# Patient Record
Sex: FEMALE | Race: WHITE | NOT HISPANIC OR LATINO | Employment: UNEMPLOYED | URBAN - METROPOLITAN AREA
[De-identification: names, ages, dates, MRNs, and addresses within clinical notes are randomized per-mention and may not be internally consistent; named-entity substitution may affect disease eponyms.]

---

## 2017-04-04 RX ORDER — GABAPENTIN 300 MG/1
CAPSULE ORAL
Qty: 150 CAPSULE | Refills: 0 | Status: SHIPPED | OUTPATIENT
Start: 2017-04-04 | End: 2017-06-12 | Stop reason: SDUPTHER

## 2017-06-12 RX ORDER — GABAPENTIN 300 MG/1
CAPSULE ORAL
Qty: 150 CAPSULE | Refills: 0 | Status: SHIPPED | OUTPATIENT
Start: 2017-06-12 | End: 2017-09-12 | Stop reason: SDUPTHER

## 2017-06-15 ENCOUNTER — PATIENT OUTREACH (OUTPATIENT)
Dept: ADMINISTRATIVE | Facility: HOSPITAL | Age: 52
End: 2017-06-15

## 2017-07-10 DIAGNOSIS — Z12.11 COLON CANCER SCREENING: ICD-10-CM

## 2017-07-12 RX ORDER — GABAPENTIN 300 MG/1
CAPSULE ORAL
Qty: 150 CAPSULE | Refills: 0 | OUTPATIENT
Start: 2017-07-12 | End: 2017-08-11

## 2017-09-14 RX ORDER — GABAPENTIN 300 MG/1
CAPSULE ORAL
Qty: 150 CAPSULE | Refills: 3 | Status: SHIPPED | OUTPATIENT
Start: 2017-09-14 | End: 2018-04-19 | Stop reason: SDUPTHER

## 2018-02-13 ENCOUNTER — HOSPITAL ENCOUNTER (EMERGENCY)
Facility: HOSPITAL | Age: 53
Discharge: HOME | End: 2018-02-13
Attending: EMERGENCY MEDICINE
Payer: COMMERCIAL

## 2018-02-13 VITALS
DIASTOLIC BLOOD PRESSURE: 91 MMHG | OXYGEN SATURATION: 100 % | WEIGHT: 160 LBS | HEIGHT: 66 IN | RESPIRATION RATE: 18 BRPM | BODY MASS INDEX: 25.71 KG/M2 | SYSTOLIC BLOOD PRESSURE: 135 MMHG | TEMPERATURE: 98 F | HEART RATE: 77 BPM

## 2018-02-13 DIAGNOSIS — S51.812A LACERATION OF LEFT FOREARM, INITIAL ENCOUNTER: Primary | ICD-10-CM

## 2018-02-13 PROCEDURE — 12002 RPR S/N/AX/GEN/TRNK2.6-7.5CM: CPT

## 2018-02-13 PROCEDURE — 25000003 PHARM REV CODE 250: Performed by: EMERGENCY MEDICINE

## 2018-02-13 PROCEDURE — 99283 EMERGENCY DEPT VISIT LOW MDM: CPT | Mod: 25

## 2018-02-13 RX ORDER — LIDOCAINE HYDROCHLORIDE 10 MG/ML
10 INJECTION, SOLUTION EPIDURAL; INFILTRATION; INTRACAUDAL; PERINEURAL
Status: COMPLETED | OUTPATIENT
Start: 2018-02-13 | End: 2018-02-13

## 2018-02-13 RX ORDER — LIDOCAINE HYDROCHLORIDE 10 MG/ML
5 INJECTION, SOLUTION EPIDURAL; INFILTRATION; INTRACAUDAL; PERINEURAL
Status: DISCONTINUED | OUTPATIENT
Start: 2018-02-13 | End: 2018-02-13

## 2018-02-13 RX ADMIN — LIDOCAINE HYDROCHLORIDE 100 MG: 10 INJECTION, SOLUTION EPIDURAL; INFILTRATION; INTRACAUDAL; PERINEURAL at 06:02

## 2018-02-13 NOTE — ED PROVIDER NOTES
Encounter Date: 2018       History     Chief Complaint   Patient presents with    Laceration     laceration to L wrist after tripping on sidewalk and catching herself with her wrist on old fence and cut her wrist. Pressure dressing applied at triage.     Pt is a 52-year-old female with pmhx of back pain who presents today with a laceration noted to left forearm that occurred PTA. Pt was walking down the sidewalk, tripped, and caught herself with her left arm on a [place]iron fence. Denies hitting her head. Denies LOC, dizziness, N/V, neck pain. Denies numbness/tingling, fb sensation, and decreased ROM.  Bleeding controlled.  Pt is UTD on tetanus shot. Denies any other complaints at this time.       The history is provided by the patient.   Laceration    The incident occurred just prior to arrival. The laceration is located on the left arm. The laceration is 3 cm in size. The laceration mechanism was a a metal edge. The pain is at a severity of 4/10. The pain has been intermittent since onset. She reports no foreign bodies present. Her tetanus status is UTD.     Review of patient's allergies indicates:  No Known Allergies  Past Medical History:   Diagnosis Date    Allergy     seasonal    Arthritis     Asthma     Atypical nevi     Joint pain     Melanoma     treated surgically in [place]. No SLN bx, chemo, or radiation    Ulcer     stomach      Past Surgical History:   Procedure Laterality Date    CARPAL TUNNEL RELEASE       SECTION      FOOT FRACTURE SURGERY      KNEE ARTHROSCOPY      RHINOPHYMA RESECTION      right hand surgery      SHOULDER SURGERY       Family History   Problem Relation Age of Onset    Adopted: Yes    Melanoma Neg Hx     Psoriasis Neg Hx     Lupus Neg Hx     Eczema Neg Hx     Acne Neg Hx     Hypertension Neg Hx     Diabetes Neg Hx      Social History   Substance Use Topics    Smoking status: Never Smoker    Smokeless tobacco: Never Used    Alcohol use 6.0 oz/week      10 Standard drinks or equivalent per week      Comment: social     Review of Systems   Constitutional: Negative for activity change, chills and fever.   HENT: Negative for facial swelling.    Eyes: Negative for pain and visual disturbance.   Respiratory: Negative for chest tightness, shortness of breath and wheezing.    Cardiovascular: Negative for chest pain and palpitations.   Gastrointestinal: Negative for abdominal pain, nausea and vomiting.   Genitourinary: Negative for difficulty urinating.   Musculoskeletal: Negative for back pain, myalgias, neck pain and neck stiffness.   Skin: Negative for rash.        Forearm laceration   Allergic/Immunologic: Negative for immunocompromised state.   Neurological: Negative for dizziness, syncope, facial asymmetry, weakness, light-headedness and numbness.   Hematological: Does not bruise/bleed easily.   Psychiatric/Behavioral: Negative for confusion.   All other systems reviewed and are negative.      Physical Exam     Initial Vitals [02/13/18 1618]   BP Pulse Resp Temp SpO2   (!) 135/91 77 18 98 °F (36.7 °C) 100 %      MAP       105.67         Physical Exam    Nursing note and vitals reviewed.  Constitutional: Vital signs are normal. She appears well-developed and well-nourished. She is not diaphoretic. She is cooperative.  Non-toxic appearance. She does not have a sickly appearance. She does not appear ill. No distress.   HENT:   Head: Normocephalic.   Right Ear: Hearing, tympanic membrane, external ear and ear canal normal.   Left Ear: Hearing, external ear and ear canal normal.   Nose: Nose normal. No rhinorrhea or nose lacerations.   Mouth/Throat: Oropharynx is clear and moist.   Eyes: Pupils are equal, round, and reactive to light.   Neck: Normal range of motion and full passive range of motion without pain. Neck supple.   Cardiovascular: Normal rate, regular rhythm and normal heart sounds.   Pulses:       Radial pulses are 2+ on the right side, and 2+ on the left  side.   Pulmonary/Chest: Effort normal and breath sounds normal. No respiratory distress. She has no decreased breath sounds. She has no wheezes. She has no rhonchi. She has no rales.   Abdominal: Soft. Bowel sounds are normal. There is no tenderness.   Musculoskeletal: Normal range of motion. She exhibits no tenderness.        Left forearm: She exhibits laceration. She exhibits no tenderness, no bony tenderness, no swelling, no edema and no deformity.        Arms:  Neurological: She is alert and oriented to person, place, and time. She has normal strength. No sensory deficit. She exhibits normal muscle tone. Coordination and gait normal.   Skin: Skin is warm and dry. Capillary refill takes less than 2 seconds. Bruising and laceration noted. No rash noted. No erythema.        Approximately 3 cm laceration noted to left forearm, no FB noted.  No active bleeding   Psychiatric: She has a normal mood and affect. Her speech is normal and behavior is normal. Judgment normal. Cognition and memory are normal.         ED Course   Lac Repair  Date/Time: 2/13/2018 6:32 PM  Performed by: SUE HAIRSTON  Authorized by: LETI DRISCOLL   Body area: upper extremity  Location details: left lower arm  Laceration length: 3 cm  Foreign bodies: no foreign bodies  Tendon involvement: none  Nerve involvement: none  Vascular damage: no  Anesthesia: local infiltration    Anesthesia:  Local Anesthetic: lidocaine 1% without epinephrine  Anesthetic total: 10 mL  Patient sedated: no  Irrigation solution: saline  Irrigation method: syringe  Amount of cleaning: standard  Debridement: none  Degree of undermining: none  Skin closure: 4-0 Prolene  Number of sutures: 6  Technique: simple  Approximation: loose  Approximation difficulty: simple  Dressing: fine mesh gauze and non-stick sterile dressing  Patient tolerance: Patient tolerated the procedure well with no immediate complications        Labs Reviewed - No data to display        X-Rays:   Independently Interpreted Readings:   Other Readings:  Left FA:  No f.b.          Medical Decision Making:   Initial Assessment:   Pt is a 52-year-old female who presents to ED with accidental laceration to left forearm. Pt has 3 cm laceration noted to her left forearm. No foreign bodies noted. Bleeding controlled. Tetanus UTD. Sensation and strength intact in bilateral upper extremities. Radial 2+ bilaterally by palpation.   Differential Diagnosis:   Laceration, retained foreign body  Clinical Tests:   Radiological Study: Ordered and Reviewed  ED Management:  Xray, sutures   Xray revealed no foreign bodies. Xray read by radiologist and reviewed by physician and me. Pt instructed to get sutures removed in 7-10 days and to return to ED if she notices any redness, yellow drainage, or has any uncontrollable pain. Pt verbalized understanding, is in agreement and compliance with treatment plan. Laceration well-approximated.               Attending Attestation:     Physician Attestation Statement for NP/PA:   I have conducted a face to face encounter with this patient in addition to the NP/PA, due to Medical Complexity    Other NP/PA Attestation Additions:    History of Present Illness: agree   Physical Exam: Agree:  3 cm FA laceration, no FB.  Sensation, motor intact.     Medical Decision Making: Agree:  Wound sutured and well approximated by NP.  No active bleeding.  Pt remains NV intact.  She will f/u with pcp in 10 days for suture removal.                 ED Course      Clinical Impression:   The primary encounter diagnosis was Laceration of left forearm, initial encounter. A diagnosis of Laceration and contusion of cerebral cortex was also pertinent to this visit.    Disposition:   Disposition: Discharged  Condition: Stable                        Cristiano Woodard NP  02/13/18 [id number]       Gema Bellamy MD  02/13/18 1911

## 2018-02-13 NOTE — ED NOTES
Patient identifiers verified and correct for Maria R Tucker Ohri.  Pt sitting up on edge of bed, AAO x's 3,  at bedside. Pt stated that she came to the ER with c/o left wrist laceration second to falling and landing on a fence. Laceration to left wrist noted to be about 5 cm x 3 cm with minimal bleeding noted. Pressure dressing removed.   LOC: The patient is awake, alert and aware of environment with an appropriate affect, the patient is oriented x 3 and speaking appropriately.  APPEARANCE: Patient resting comfortably and in no acute distress, patient is clean and well groomed, patient's clothing is properly fastened.  SKIN: The skin is warm and dry, color consistent with ethnicity, patient has normal skin turgor and moist mucus membranes, skin intact, no breakdown or bruising noted.  MUSCULOSKELETAL: Patient moving all extremities spontaneously, no obvious swelling or deformities noted.  RESPIRATORY: Airway is open and patent, respirations are spontaneous, patient has a normal effort and rate.  CARDIAC: Patient has a normal rate and regular rhythm, no periphreal edema noted, capillary refill < 3 seconds.  ABDOMEN: Soft and non tender to palpation, no distention noted, normoactive bowel sounds present in all four quadrants.  NEUROLOGIC: PERRL, eyes open spontaneously, behavior appropriate to situation, follows commands, facial expression symmetrical, bilateral hand grasp equal and even, purposeful motor response noted, normal sensation in all extremities when touched with a finger.

## 2018-02-14 NOTE — DISCHARGE INSTRUCTIONS
Sutures need to come out in 7-10 days. Return to ED if you experience uncontrollable pain, fever, redness,or yellow drainage from area.

## 2018-02-23 ENCOUNTER — OFFICE VISIT (OUTPATIENT)
Dept: INTERNAL MEDICINE | Facility: CLINIC | Age: 53
End: 2018-02-23
Payer: COMMERCIAL

## 2018-02-23 VITALS
TEMPERATURE: 98 F | RESPIRATION RATE: 18 BRPM | DIASTOLIC BLOOD PRESSURE: 76 MMHG | WEIGHT: 164 LBS | HEART RATE: 67 BPM | HEIGHT: 66 IN | SYSTOLIC BLOOD PRESSURE: 108 MMHG | BODY MASS INDEX: 26.36 KG/M2

## 2018-02-23 DIAGNOSIS — R68.89 FLU-LIKE SYMPTOMS: ICD-10-CM

## 2018-02-23 DIAGNOSIS — Z48.02 VISIT FOR SUTURE REMOVAL: ICD-10-CM

## 2018-02-23 DIAGNOSIS — L03.114 CELLULITIS OF LEFT UPPER EXTREMITY: Primary | ICD-10-CM

## 2018-02-23 PROCEDURE — 99999 PR PBB SHADOW E&M-EST. PATIENT-LVL III: CPT | Mod: PBBFAC,,, | Performed by: INTERNAL MEDICINE

## 2018-02-23 PROCEDURE — 3008F BODY MASS INDEX DOCD: CPT | Mod: S$GLB,,, | Performed by: INTERNAL MEDICINE

## 2018-02-23 PROCEDURE — 99214 OFFICE O/P EST MOD 30 MIN: CPT | Mod: S$GLB,,, | Performed by: INTERNAL MEDICINE

## 2018-02-23 PROCEDURE — 99024 POSTOP FOLLOW-UP VISIT: CPT | Mod: S$GLB,,, | Performed by: INTERNAL MEDICINE

## 2018-02-23 RX ORDER — CEPHALEXIN 500 MG/1
500 CAPSULE ORAL EVERY 12 HOURS
Qty: 14 CAPSULE | Refills: 0 | Status: SHIPPED | OUTPATIENT
Start: 2018-02-23 | End: 2018-03-02

## 2018-02-23 RX ORDER — MUPIROCIN 20 MG/G
OINTMENT TOPICAL 3 TIMES DAILY
Qty: 30 G | Refills: 0 | Status: SHIPPED | OUTPATIENT
Start: 2018-02-23

## 2018-02-23 NOTE — PROGRESS NOTES
"Subjective:       Patient ID: Maria R Preciado is a 52 y.o. female.    Chief Complaint: Suture / Staple Removal; Fever; Cough; Sore Throat; and Generalized Body Aches    HPI   Patient presenting with fever and cough.  She notes sore throat for the last 7 days.  She was seen in ED about 10 days ago and thinks she may have picked something up there.  She feels like "breathing is off."  She notes chills and some muscle aches.  She has been using medications and has had mild relief.  No SOB or Cp.     She also is here for suture removal.  She had laceration of left wrist on 2/13 and was seen in the Ed.  She notes continued redness and swelling.  She also notes some tenderness.      Review of Systems   Constitutional: Positive for chills and fever. Negative for activity change.   HENT: Positive for congestion, postnasal drip, rhinorrhea and sore throat. Negative for sinus pain and sinus pressure.    Eyes: Negative for pain and redness.   Respiratory: Positive for cough. Negative for shortness of breath.    Cardiovascular: Negative for chest pain.   Gastrointestinal: Negative for abdominal pain, constipation, nausea and vomiting.   Genitourinary: Negative for difficulty urinating.   Musculoskeletal: Positive for myalgias. Negative for arthralgias and joint swelling.   Skin: Positive for wound. Negative for rash.   Neurological: Negative for dizziness and light-headedness.   Psychiatric/Behavioral: Negative for dysphoric mood and sleep disturbance.     Objective:     Vitals:    02/23/18 1120   BP: 108/76   Pulse: 67   Resp: 18   Temp: 98.2 °F (36.8 °C)    Body mass index is 26.47 kg/m².  Physical Exam   Constitutional: She is oriented to person, place, and time. She appears well-developed and well-nourished.   HENT:   Head: Normocephalic and atraumatic.   Mouth/Throat: Oropharynx is clear and moist.   Eyes: Conjunctivae are normal. Pupils are equal, round, and reactive to light.   Neck: Normal range of motion. No " tracheal deviation present. No thyromegaly present.   Cardiovascular: Normal rate, regular rhythm, normal heart sounds and intact distal pulses.  Exam reveals no gallop and no friction rub.    No murmur heard.  Pulmonary/Chest: Effort normal and breath sounds normal. She has no wheezes. She has no rales.   Musculoskeletal: She exhibits edema and tenderness.        Left wrist: She exhibits tenderness.   Left wrist with laceration present, +erythema and edema, mild fluctuance, TTP   Lymphadenopathy:        Head (right side): Tonsillar adenopathy present.        Head (left side): Tonsillar adenopathy present.     She has cervical adenopathy.   Neurological: She is alert and oriented to person, place, and time.   Skin: Skin is warm and dry. No rash noted.   Psychiatric: She has a normal mood and affect. Judgment normal.     Assessment:     1. Cellulitis of left upper extremity    2. Visit for suture removal    3. Flu-like symptoms      Plan:   1. Cellulitis of left upper extremity  - warm compresses, vaseline  - cephALEXin (KEFLEX) 500 MG capsule; Take 1 capsule (500 mg total) by mouth every 12 (twelve) hours.  Dispense: 14 capsule; Refill: 0  - mupirocin (BACTROBAN) 2 % ointment; Apply topically 3 (three) times daily.  Dispense: 30 g; Refill: 0  - ED precautions provided    2. Visit for suture removal  Subjective:      Maria R Preciado is a 52 y.o. female who obtained a laceration 10 days ago, which required closure with 6 sutures. Mechanism of injury: tripped and cut on chain fence. She denies pain, redness, or drainage from the wound. Her last tetanus was 4 years ago.    The following portions of the patient's history were reviewed and updated as appropriate: allergies, current medications, past family history, past medical history, past social history, past surgical history and problem list.    Review of Systems  Pertinent items are noted in HPI.      Objective:      /76 (BP Location: Left arm, Patient  "Position: Sitting)   Pulse 67   Temp 98.2 °F (36.8 °C) (Oral)   Resp 18   Ht 5' 6" (1.676 m)   Wt 74.4 kg (164 lb 0.4 oz)   LMP 01/07/2018   BMI 26.47 kg/m²   Injury exam:  A 3 cm laceration noted on the left dorsal wrist is healing well, with evidence of infection.      Assessment:      Laceration is healing well, with evidence of infection.      Plan:        1. 6 sutures were removed.  2. Wound care discussed.  3. Follow up as needed.     3. Flu-like symptoms  - fluids, tylenol/NSAIDs prn, OTC antihistamine  - outside the window for tamiflu        Patient is to call or return to clinic if symptoms worsen or fail to improve.    "

## 2018-02-23 NOTE — PATIENT INSTRUCTIONS
Cellulitis  Cellulitis is an infection of the deep layers of skin. A break in the skin, such as a cut or scratch, can let bacteria under the skin. If the bacteria get to deep layers of the skin, it can be serious. If not treated, cellulitis can get into the bloodstream and lymph nodes. The infection can then spread throughout the body. This causes serious illness.  Cellulitis causes the affected skin to become red, swollen, warm, and sore. The reddened areas have a visible border. An open sore may leak fluid (pus). You may have a fever, chills, and pain.  Cellulitis is treated with antibiotics taken for 7 to 10 days. An open sore may be cleaned and covered with cool wet gauze. Symptoms should get better 1 to 2 days after treatment is started. Make sure to take all the antibiotics for the full number of days until they are gone. Keep taking the medicine even if your symptoms go away.  Home care  Follow these tips:  · Limit the use of the part of your body with cellulitis.   · If the infection is on your leg, keep your leg raised while sitting. This will help to reduce swelling.  · Take all of the antibiotic medicine exactly as directed until it is gone. Do not miss any doses, especially during the first 7 days. Dont stop taking the medicine when your symptoms get better.  · Keep the affected area clean and dry.  · Wash your hands with soap and warm water before and after touching your skin. Anyone else who touches your skin should also wash his or her hands. Don't share towels.  Follow-up care  Follow up with your healthcare provider, or as advised. If your infection does not go away on the first antibiotic, your healthcare provider will prescribe a different one.  When to seek medical advice  Call your healthcare provider right away if any of these occur:  · Red areas that spread  · Swelling or pain that gets worse  · Fluid leaking from the skin (pus)  · Fever higher of 100.4º F (38.0º C) or higher after 2 days  on antibiotics  Date Last Reviewed: 9/1/2016  © 9914-4510 [employer]. [address] [place] [address], [place] [address]. All rights reserved. This information is not intended as a substitute for professional medical care. Always follow your healthcare professional's instructions.

## 2018-03-05 ENCOUNTER — OFFICE VISIT (OUTPATIENT)
Dept: URGENT CARE | Facility: CLINIC | Age: 53
End: 2018-03-05
Payer: COMMERCIAL

## 2018-03-05 VITALS
RESPIRATION RATE: 18 BRPM | TEMPERATURE: 98 F | BODY MASS INDEX: 26.36 KG/M2 | SYSTOLIC BLOOD PRESSURE: 139 MMHG | HEART RATE: 70 BPM | DIASTOLIC BLOOD PRESSURE: 79 MMHG | OXYGEN SATURATION: 100 % | HEIGHT: 66 IN | WEIGHT: 164 LBS

## 2018-03-05 DIAGNOSIS — S41.112D LACERATION OF LEFT UPPER EXTREMITY, SUBSEQUENT ENCOUNTER: Primary | ICD-10-CM

## 2018-03-05 DIAGNOSIS — S59.902A ELBOW INJURY, LEFT, INITIAL ENCOUNTER: ICD-10-CM

## 2018-03-05 DIAGNOSIS — M77.02 EPICONDYLITIS ELBOW, MEDIAL, LEFT: ICD-10-CM

## 2018-03-05 PROCEDURE — 99214 OFFICE O/P EST MOD 30 MIN: CPT | Mod: S$GLB,,, | Performed by: NURSE PRACTITIONER

## 2018-03-05 RX ORDER — SULFAMETHOXAZOLE AND TRIMETHOPRIM 800; 160 MG/1; MG/1
1 TABLET ORAL 2 TIMES DAILY
Qty: 10 TABLET | Refills: 0 | Status: SHIPPED | OUTPATIENT
Start: 2018-03-05 | End: 2018-03-10

## 2018-03-05 NOTE — PATIENT INSTRUCTIONS
Extremity Laceration: Suture or Tape (Child)  A laceration is a cut through the skin. If it is large or deep, it may require stitches (sutures) or staples to close the wound so it can heal. Minor cuts may be closed with surgical tape.   X-rays may be done if something may have entered the skin through the cut. Your child may also need a tetanus shot if he or she is not up to date on this vaccination.  Home care  Your childs health care provider may prescribe an antibiotic to help prevent infection. Follow all instructions for giving this medicine to your child. Make sure your child takes the medicine every day until it is gone or told to stop.  If your child has pain, you can give him or her pain medicine as advised by the healthcare provider. Do not give your child aspirin.  In rare cases it can cause serious problems in children 15 years of age and younger.  Dont give your child any other medicine without asking the healthcare provider first.    General care  · Follow the health care providers instructions on how to care for the cut.  · Wash your hands with soap and warm water before and after caring for your child's cut. This is to help prevent infection.  · Leave the original bandage in place for 24 hours. Replace it if it becomes wet or dirty. After 24 hours, change it once a day or as directed.  · Clean the wound daily. First, remove the bandage. Then wash the area gently with soap and warm water, or as directed by your childs health care provider. Use a wet cotton swab to loosen and remove any blood or crust that forms. After cleaning, apply a thin layer of antibiotic ointment if advised. Then put on a new bandage.  · Caring for sutures or staples: Clean the wound daily. First, remove the bandage. Then wash the area gently with soap and warm water, or as directed by your childs provider. Use a wet cotton swab to loosen and remove any blood or crust that forms. After cleaning, apply a thin layer of  antibiotic ointment if advised. Then put on a new bandage.  · Caring for surgical tape: Keep the area dry. If it gets wet, blot it dry with a clean towel. Surgical tape usually falls off within 7 to 10 days. If it has not fallen off after 10 days, you can take it off yourself. Put mineral oil or petroleum jelly on a cotton ball and gently rub the tape until it is removed.  · Make sure your child does not scratch, rub, or pick at the area. A baby may need to wear scratch mittens.  · Avoid soaking the cut in water. Have your child shower or take sponge baths instead of tub baths. Dont let your child go swimming.   · If the area gets wet, gently pat it dry with a clean cloth. Replace the wet bandage with a dry one.  Follow-up care  Follow up with your childs health care provider. Make a follow-up appointment to have the sutures or staples removed, if directed.  Special note to parents  Healthcare providers are trained to see injuries such as this in young children as a sign of possible abuse. You may be asked questions about how your child was injured. Health care providers are required by law to ask you these questions. This is done to protect your child. Please try to be patient.  When to seek medical advice  Call the child's healthcare provider for any of the following:  · Wound bleeding not controlled by direct pressure  · Signs of infection, including increasing pain in the wound, increasing wound redness or swelling, or pus or bad odor coming from the wound  · Fever of 100.4°F (38ºC) or higher or as directed by the child's healthcare provider  · Stitches or staples come apart or fall out or surgical tape falls off before 7 days  · Wound edges re-open  · Wound changes colors  · Numbness around the wound   · Decreased movement around the injured area  Date Last Reviewed: 6/14/2015  © 2772-5420 [employer]. [address] [place] [address], [place] [address]. All rights reserved. This information is not  intended as a substitute for professional medical care. Always follow your healthcare professional's instructions.        Understanding Medial Epicondylitis    Several muscles attach to the arm at the elbow joint. The tough bands of tissue that attach muscle to bones are called tendons. The bone in the upper arm has knobs on the farthest end called epicondyles. Tendons attach some arm muscles to these knobs. The tissues in this area can become irritated.  Epicondylitis is the medical term for a painful elbow over the epicondyle. Medial refers to the inner side of the elbow. Medial epicondylitis is sometimes called golfers elbow.     How to say it  [id number]   Causes of medial epicondylitis  A painful inner elbow may be caused by:  · Using an elbow or hand the same way over and over  · Using poor form or too much force in a sport such as golf, tennis, or baseball  · Lifting too heavy a weight  · Other injuries to the arm or elbow  Symptoms of medial epicondylitis  · Pain or tenderness on the inside of the elbow that may travel down the forearm  · Pain when moving the wrist  · Pain or weakness when gripping something  · A crackling sound or grating feeling when moving the elbow  Treatment for medial epicondylitis  Treatments may include:  · Avoiding or changing the action that caused the problem. This helps prevent irritating the tissues more.  · Prescription or over-the-counter pain medicines. These help reduce inflammation, swelling, and pain.  · Cold or heat packs. These help reduce pain and swelling.  · Stretching and other exercises. These improve flexibility and strength.  · Physical therapy. This may include exercises or other treatments.  · Injections of medicine. This may relieve symptoms.  If other treatments do not relieve symptoms, you may need surgery.  Possible complications  If you dont give your elbow time to heal, symptoms may return or get worse. Follow your healthcare  providers instructions on resting and treating your elbow.     When to call your healthcare provider  Call your healthcare provider right away if you have any of these:  · Fever of 100.4°F (38°C) or higher, or as directed  · Redness, swelling, or warmth that gets worse  · Symptoms that dont get better with prescribed medicines, or get worse  · New symptoms   Date Last Reviewed: 3/10/2016  © 7064-6555 [employer]. [address] [place] [address], [place] [address]. All rights reserved. This information is not intended as a substitute for professional medical care. Always follow your healthcare professional's instructions.

## 2018-03-05 NOTE — PROGRESS NOTES
"Subjective:       Patient ID: Maria R Preciado is a 52 y.o. female.    Vitals:  height is 5' 6" (1.676 m) and weight is 74.4 kg (164 lb). Her oral temperature is 98.2 °F (36.8 °C). Her blood pressure is 139/79 and her pulse is 70. Her respiration is 18 and oxygen saturation is 100%.     Chief Complaint: Arm Pain (Left arm infection )    Patient states that she had stitches in her left forearm on 2/13/18. She states that she took antibiotics for the laceration to completion but she thinks the antibiotics did not work. The patient was given Keflex for the infection. There has been no heat/fever to the incision, it is well approximated and there is no exudate.  There is swelling and tenderness.    She states that she fell on her left elbow with the above injury and thinks it may have a small fracture. She feels as if there is a catch when she extends her elbow, and has intermittent numbness and tingling to her hand and fingers (fingers 3,4,5). Feels as if it has worsened.      Arm Pain    The incident occurred more than 1 week ago. The incident occurred in the street. The injury mechanism was a fall. The pain is present in the left forearm. The quality of the pain is described as aching. The pain does not radiate. The pain is mild. The pain has been intermittent since the incident. Pertinent negatives include no chest pain or numbness. Nothing aggravates the symptoms. Treatments tried: Antibiotics  The treatment provided no relief.     Review of Systems   Constitution: Negative for weakness and malaise/fatigue.   HENT: Negative for nosebleeds.    Cardiovascular: Negative for chest pain and syncope.   Respiratory: Negative for shortness of breath.    Musculoskeletal: Positive for joint pain. Negative for back pain and neck pain.        Left arm laceration      Gastrointestinal: Negative for abdominal pain.   Genitourinary: Negative for hematuria.   Neurological: Positive for paresthesias. Negative for dizziness and " numbness.       Objective:      Physical Exam   Constitutional: She is oriented to person, place, and time. She appears well-developed and well-nourished.   HENT:   Head: Normocephalic and atraumatic. Head is without abrasion, without contusion and without laceration.   Right Ear: External ear normal.   Left Ear: External ear normal.   Nose: Nose normal.   Eyes: Conjunctivae, EOM and lids are normal. Pupils are equal, round, and reactive to light.   Neck: Trachea normal, full passive range of motion without pain and phonation normal. Neck supple.   Cardiovascular: Normal rate, regular rhythm and normal heart sounds.    Pulses:       Radial pulses are 2+ on the left side.   Pulmonary/Chest: Effort normal and breath sounds normal. No stridor. No respiratory distress.   Musculoskeletal: Normal range of motion.        Left forearm: She exhibits no tenderness, no bony tenderness, no swelling, no edema and no deformity.   Neurological: She is alert and oriented to person, place, and time. No sensory deficit.   Skin: Skin is warm, dry and intact. Capillary refill takes less than 2 seconds. Lesion noted. No abrasion, no bruising, no burn, no ecchymosis, no laceration and no rash noted. No erythema.        Well approximated healed laceration as marked.  Surrounding edema without heat. TTP.   Psychiatric: She has a normal mood and affect. Her speech is normal and behavior is normal. Judgment and thought content normal. Cognition and memory are normal.   Nursing note and vitals reviewed.      Left elbow Xray:    No fracture or dislocation.  No Bone destruction identified  Assessment:       1. Laceration of left upper extremity, subsequent encounter    2. Epicondylitis elbow, medial, left    3. Elbow injury, left, initial encounter        Plan:         Laceration of left upper extremity, subsequent encounter  -     sulfamethoxazole-trimethoprim 800-160mg (BACTRIM DS) 800-160 mg Tab; Take 1 tablet by mouth 2 (two) times daily.   Dispense: 10 tablet; Refill: 0    Epicondylitis elbow, medial, left  -     Ambulatory referral to Orthopedics    Elbow injury, left, initial encounter  -     X-Ray Elbow Complete Left; Future; Expected date: 03/05/2018  -     Ambulatory referral to Orthopedics      Patient Instructions       Extremity Laceration: Suture or Tape (Child)  A laceration is a cut through the skin. If it is large or deep, it may require stitches (sutures) or staples to close the wound so it can heal. Minor cuts may be closed with surgical tape.   X-rays may be done if something may have entered the skin through the cut. Your child may also need a tetanus shot if he or she is not up to date on this vaccination.  Home care  Your childs health care provider may prescribe an antibiotic to help prevent infection. Follow all instructions for giving this medicine to your child. Make sure your child takes the medicine every day until it is gone or told to stop.  If your child has pain, you can give him or her pain medicine as advised by the healthcare provider. Do not give your child aspirin.  In rare cases it can cause serious problems in children 15 years of age and younger.  Dont give your child any other medicine without asking the healthcare provider first.    General care  · Follow the health care providers instructions on how to care for the cut.  · Wash your hands with soap and warm water before and after caring for your child's cut. This is to help prevent infection.  · Leave the original bandage in place for 24 hours. Replace it if it becomes wet or dirty. After 24 hours, change it once a day or as directed.  · Clean the wound daily. First, remove the bandage. Then wash the area gently with soap and warm water, or as directed by your childs health care provider. Use a wet cotton swab to loosen and remove any blood or crust that forms. After cleaning, apply a thin layer of antibiotic ointment if advised. Then put on a new  bandage.  · Caring for sutures or staples: Clean the wound daily. First, remove the bandage. Then wash the area gently with soap and warm water, or as directed by your childs provider. Use a wet cotton swab to loosen and remove any blood or crust that forms. After cleaning, apply a thin layer of antibiotic ointment if advised. Then put on a new bandage.  · Caring for surgical tape: Keep the area dry. If it gets wet, blot it dry with a clean towel. Surgical tape usually falls off within 7 to 10 days. If it has not fallen off after 10 days, you can take it off yourself. Put mineral oil or petroleum jelly on a cotton ball and gently rub the tape until it is removed.  · Make sure your child does not scratch, rub, or pick at the area. A baby may need to wear scratch mittens.  · Avoid soaking the cut in water. Have your child shower or take sponge baths instead of tub baths. Dont let your child go swimming.   · If the area gets wet, gently pat it dry with a clean cloth. Replace the wet bandage with a dry one.  Follow-up care  Follow up with your childs health care provider. Make a follow-up appointment to have the sutures or staples removed, if directed.  Special note to parents  Healthcare providers are trained to see injuries such as this in young children as a sign of possible abuse. You may be asked questions about how your child was injured. Health care providers are required by law to ask you these questions. This is done to protect your child. Please try to be patient.  When to seek medical advice  Call the child's healthcare provider for any of the following:  · Wound bleeding not controlled by direct pressure  · Signs of infection, including increasing pain in the wound, increasing wound redness or swelling, or pus or bad odor coming from the wound  · Fever of 100.4°F (38ºC) or higher or as directed by the child's healthcare provider  · Stitches or staples come apart or fall out or surgical tape falls off  before 7 days  · Wound edges re-open  · Wound changes colors  · Numbness around the wound   · Decreased movement around the injured area  Date Last Reviewed: 6/14/2015 © 2000-2017 [employer]. [address] [place] [address], [place] [address]. All rights reserved. This information is not intended as a substitute for professional medical care. Always follow your healthcare professional's instructions.        Understanding Medial Epicondylitis    Several muscles attach to the arm at the elbow joint. The tough bands of tissue that attach muscle to bones are called tendons. The bone in the upper arm has knobs on the farthest end called epicondyles. Tendons attach some arm muscles to these knobs. The tissues in this area can become irritated.  Epicondylitis is the medical term for a painful elbow over the epicondyle. Medial refers to the inner side of the elbow. Medial epicondylitis is sometimes called golfers elbow.     How to say it  [id number]   Causes of medial epicondylitis  A painful inner elbow may be caused by:  · Using an elbow or hand the same way over and over  · Using poor form or too much force in a sport such as golf, tennis, or baseball  · Lifting too heavy a weight  · Other injuries to the arm or elbow  Symptoms of medial epicondylitis  · Pain or tenderness on the inside of the elbow that may travel down the forearm  · Pain when moving the wrist  · Pain or weakness when gripping something  · A crackling sound or grating feeling when moving the elbow  Treatment for medial epicondylitis  Treatments may include:  · Avoiding or changing the action that caused the problem. This helps prevent irritating the tissues more.  · Prescription or over-the-counter pain medicines. These help reduce inflammation, swelling, and pain.  · Cold or heat packs. These help reduce pain and swelling.  · Stretching and other exercises. These improve flexibility and strength.  · Physical therapy.  This may include exercises or other treatments.  · Injections of medicine. This may relieve symptoms.  If other treatments do not relieve symptoms, you may need surgery.  Possible complications  If you dont give your elbow time to heal, symptoms may return or get worse. Follow your healthcare providers instructions on resting and treating your elbow.     When to call your healthcare provider  Call your healthcare provider right away if you have any of these:  · Fever of 100.4°F (38°C) or higher, or as directed  · Redness, swelling, or warmth that gets worse  · Symptoms that dont get better with prescribed medicines, or get worse  · New symptoms   Date Last Reviewed: 3/10/2016  © 4462-5482 [employer]. [address] [place] [address], [place] [address]. All rights reserved. This information is not intended as a substitute for professional medical care. Always follow your healthcare professional's instructions.

## 2018-03-08 ENCOUNTER — TELEPHONE (OUTPATIENT)
Dept: URGENT CARE | Facility: CLINIC | Age: 53
End: 2018-03-08

## 2018-04-19 RX ORDER — GABAPENTIN 300 MG/1
CAPSULE ORAL
Qty: 150 CAPSULE | Refills: 0 | Status: SHIPPED | OUTPATIENT
Start: 2018-04-19 | End: 2018-05-19

## 2019-07-01 DIAGNOSIS — Z12.31 ENCOUNTER FOR SCREENING MAMMOGRAM FOR MALIGNANT NEOPLASM OF BREAST: Primary | ICD-10-CM

## 2019-08-05 ENCOUNTER — HOSPITAL ENCOUNTER (OUTPATIENT)
Dept: RADIOLOGY | Facility: HOSPITAL | Age: 54
Discharge: HOME | End: 2019-08-05
Attending: NURSE PRACTITIONER
Payer: MEDICAID

## 2019-08-05 VITALS — BODY MASS INDEX: 26.36 KG/M2 | WEIGHT: 164 LBS | HEIGHT: 66 IN

## 2019-08-05 DIAGNOSIS — Z12.31 ENCOUNTER FOR SCREENING MAMMOGRAM FOR MALIGNANT NEOPLASM OF BREAST: ICD-10-CM

## 2019-08-05 PROCEDURE — 77067 MAMMO DIGITAL SCREENING BILAT WITH CAD: ICD-10-PCS | Mod: 26,,, | Performed by: RADIOLOGY

## 2019-08-05 PROCEDURE — 77067 SCR MAMMO BI INCL CAD: CPT | Mod: 26,,, | Performed by: RADIOLOGY

## 2019-08-05 PROCEDURE — 77067 SCR MAMMO BI INCL CAD: CPT | Mod: TC

## 2021-04-16 ENCOUNTER — PATIENT MESSAGE (OUTPATIENT)
Dept: RESEARCH | Facility: HOSPITAL | Age: 56
End: 2021-04-16

## 2022-10-15 ENCOUNTER — HOSPITAL ENCOUNTER (OUTPATIENT)
Dept: RADIOLOGY | Facility: HOSPITAL | Age: 57
Discharge: HOME | End: 2022-10-15
Attending: FAMILY MEDICINE
Payer: COMMERCIAL

## 2022-10-15 DIAGNOSIS — R41.3 AMNESIA: ICD-10-CM

## 2022-10-15 PROCEDURE — 70551 MRI BRAIN WITHOUT CONTRAST: ICD-10-PCS | Mod: 26,,, | Performed by: RADIOLOGY

## 2022-10-15 PROCEDURE — 70551 MRI BRAIN STEM W/O DYE: CPT | Mod: TC

## 2022-10-15 PROCEDURE — 70551 MRI BRAIN STEM W/O DYE: CPT | Mod: 26,,, | Performed by: RADIOLOGY
